# Patient Record
Sex: MALE | Race: WHITE | NOT HISPANIC OR LATINO | Employment: STUDENT | URBAN - METROPOLITAN AREA
[De-identification: names, ages, dates, MRNs, and addresses within clinical notes are randomized per-mention and may not be internally consistent; named-entity substitution may affect disease eponyms.]

---
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
## 2021-04-16 ENCOUNTER — BULK ORDERING (OUTPATIENT)
Dept: CASE MANAGEMENT | Facility: OTHER | Age: 21
End: 2021-04-16

## 2021-04-16 DIAGNOSIS — Z23 IMMUNIZATION DUE: ICD-10-CM

## 2021-07-12 ENCOUNTER — TELEPHONE (OUTPATIENT)
Dept: SPORTS MEDICINE | Facility: CLINIC | Age: 21
End: 2021-07-12

## 2021-07-12 NOTE — TELEPHONE ENCOUNTER
Caller: JESE DOAN    Relationship to patient: MOTHER    Best call back number: [phone]    Chief complaint: LOWER BACK PAIN    Type of visit: NEW PROBLEM    Requested date: ASAP    Additional notes: MOTHER STATES THAT PATIENT GOES BACK TO  AND IS WANTING AN MRI DONE BEFOREHAND. WANTS TO KNOW IF AN ORDER CAN BE SENT SOMEWHERE

## 2021-07-12 NOTE — TELEPHONE ENCOUNTER
Called and spoke with mother, informed we cannot just order MRI with no documentation or in office evaluation. She states he's going back to school, wants MRI and PT visits for him. Informed he will need an office visit for evaluation, and without this information insurance will more than likely deny the MRI and not pay for it. There has to be an indication for an MRI as well. Informed Dr. Lees was out of the office and our staffing was short this week as well. Informed we cannot guarantee an immediate appointment, I transferred her call to scheduling to make appointments best for him and his mother.   All information was verbally understood.    Thanks  Maryana

## 2021-07-21 ENCOUNTER — OFFICE VISIT (OUTPATIENT)
Dept: SPORTS MEDICINE | Facility: CLINIC | Age: 21
End: 2021-07-21

## 2021-07-21 VITALS
DIASTOLIC BLOOD PRESSURE: 56 MMHG | BODY MASS INDEX: 21.19 KG/M2 | OXYGEN SATURATION: 100 % | HEART RATE: 43 BPM | HEIGHT: 70 IN | TEMPERATURE: 97.4 F | WEIGHT: 148 LBS | SYSTOLIC BLOOD PRESSURE: 110 MMHG

## 2021-07-21 DIAGNOSIS — S39.012A STRAIN OF LUMBAR REGION, INITIAL ENCOUNTER: Primary | ICD-10-CM

## 2021-07-21 PROCEDURE — 72100 X-RAY EXAM L-S SPINE 2/3 VWS: CPT | Performed by: FAMILY MEDICINE

## 2021-07-21 PROCEDURE — 99213 OFFICE O/P EST LOW 20 MIN: CPT | Performed by: FAMILY MEDICINE

## 2021-07-21 NOTE — PROGRESS NOTES
"Chief Complaint  Back Pain (did some weightlifiting since march and injured himself during a deadlift. he states that he has done PT but wants to get a second opinion so he can get back to weightlifting again)    Subjective          Henri Aponte presents to [facility]  History of Present Illness  2 months ago patient was dead lifting, felt discomfort left lower back.  Pain has remained.  Pain worse with lifting or right lateral bending.  No radicular symptoms.  No rest pain.  Objective   Vital Signs:   /56 (BP Location: Left arm, Patient Position: Sitting, Cuff Size: Adult)   Pulse (!) 43   Temp 97.4 °F (36.3 °C) (Temporal)   Ht 177.8 cm (70\")   Wt 67.1 kg (148 lb)   SpO2 100%   BMI 21.24 kg/m²     Physical Exam  Vitals reviewed.   Constitutional:       Appearance: He is well-developed.   HENT:      Head: Normocephalic and atraumatic.   Eyes:      Conjunctiva/sclera: Conjunctivae normal.      Pupils: Pupils are equal, round, and reactive to light.   Cardiovascular:      Comments: No peripheral edema  Pulmonary:      Effort: Pulmonary effort is normal.   Musculoskeletal:      Comments: Emanation lumbar spine normal in general appearance.  Patient does have an area of tightness and tenderness to palpation along the left lower paravertebral bundles and slightly in the oblique area.  Discomfort is worse with right lateral bending.  Patient has full forward range of motion, no pain with extension and stork test.  Negative straight leg raise.   Skin:     General: Skin is warm and dry.   Neurological:      Mental Status: He is alert and oriented to person, place, and time.   Psychiatric:         Behavior: Behavior normal.        Result Review :                Lumbar Spine X-Ray  Indication: Pain  Views: AP and Lateral    Findings:  No fracture  No bony lesion  Normal soft tissues  Normal disc spaces    No prior studies were available for comparison.    Assessment and Plan  "   Diagnoses and all orders for this visit:    1. Strain of lumbar region, initial encounter (Primary)  -     XR Spine Lumbar 2 or 3 View      Offered patient prednisone and muscle relaxer, he states he prefer not to take any medication but would be appreciative of home exercises.  I have given him a set of home exercises and suggested that he follow-up here in 3 weeks if he is not seeing any significant improvement.  Would alter his weight lifting until his back is feeling better.      Follow Up   No follow-ups on file.  Patient was given instructions and counseling regarding his condition or for health maintenance advice. Please see specific information pulled into the AVS if appropriate.

## 2022-11-21 ENCOUNTER — OFFICE VISIT (OUTPATIENT)
Dept: SPORTS MEDICINE | Facility: CLINIC | Age: 22
End: 2022-11-21

## 2022-11-21 VITALS
HEART RATE: 60 BPM | HEIGHT: 70 IN | RESPIRATION RATE: 16 BRPM | OXYGEN SATURATION: 98 % | TEMPERATURE: 97.8 F | BODY MASS INDEX: 23.19 KG/M2 | DIASTOLIC BLOOD PRESSURE: 70 MMHG | SYSTOLIC BLOOD PRESSURE: 110 MMHG | WEIGHT: 162 LBS

## 2022-11-21 DIAGNOSIS — J30.1 SEASONAL ALLERGIC RHINITIS DUE TO POLLEN: ICD-10-CM

## 2022-11-21 DIAGNOSIS — R04.2 COUGH WITH HEMOPTYSIS: Primary | ICD-10-CM

## 2022-11-21 PROCEDURE — 99213 OFFICE O/P EST LOW 20 MIN: CPT | Performed by: FAMILY MEDICINE

## 2022-11-21 NOTE — PROGRESS NOTES
"Henri is a 21 y.o. year old male    Chief Complaint   Patient presents with   • Cough     New eval for cough with reports of coughing up blood - present for about 1 month, no injuries - reports being sick 09/2022 with a really bad cough - worse with increased activities - here for further evaluation and treatment        History of Present Illness   HPI   Cough started in September/October with acute illness. Slowly improved but cough persisted. Noted a few episodes of blood in sputum (faint pink). Allergies are problematic but not taking anything. No systemic symptoms.    Notes history of frequent pharyngitis growing up.     Review of Systems    /70 (BP Location: Left arm, Patient Position: Sitting, Cuff Size: Adult)   Pulse 60   Temp 97.8 °F (36.6 °C) (Temporal)   Resp 16   Ht 177.8 cm (70\")   Wt 73.5 kg (162 lb)   SpO2 98%   BMI 23.24 kg/m²          Physical Exam  Vitals reviewed.   Constitutional:       General: He is not in acute distress.     Appearance: Normal appearance. He is not ill-appearing.   HENT:      Mouth/Throat:      Tonsils: No tonsillar exudate or tonsillar abscesses. 2+ on the right. 2+ on the left.   Eyes:      Pupils: Pupils are equal, round, and reactive to light.   Cardiovascular:      Rate and Rhythm: Normal rate and regular rhythm.      Pulses: Normal pulses.      Heart sounds: Normal heart sounds.   Pulmonary:      Effort: Pulmonary effort is normal.      Breath sounds: Normal breath sounds. No rhonchi or rales.   Lymphadenopathy:      Cervical: Cervical adenopathy present.   Neurological:      Mental Status: He is alert.   Psychiatric:         Mood and Affect: Mood normal.       Chest X-Ray  Indication: Cough  Views: PA and Lateral    Findings:  Normal lung markings.  No pleural effusion.  Heart size and shape are normal.  Mediastinum is normal.  No visible rib fractures.   Overall, normal chest.    There were no previous studies available for comparison.     No current " outpatient medications on file.     Diagnoses and all orders for this visit:    Cough with hemoptysis  -     XR Chest PA & Lateral    Seasonal allergic rhinitis due to pollen    Overall I think this is benign, likely from bronchitis or his allergy drainage.  Discussed management both of these with over-the-counter allergy treatments, cough syrup, vaporizer/humidifier, etc.  We could consider steroid burst and prescription cough syrup but he would like to avoid those for now.        EMR Dragon/Transcription disclaimer:    Much of this encounter note is an electronic transcription/translation of spoken language to printed text.  The electronic translation of spoken language may permit erroneous, or at times, nonsensical words or phrases to be inadvertently transcribed.  Although I have reviewed the note for such errors some may still exist.

## 2022-12-14 ENCOUNTER — TELEPHONE (OUTPATIENT)
Dept: SPORTS MEDICINE | Facility: CLINIC | Age: 22
End: 2022-12-14

## 2022-12-14 DIAGNOSIS — R04.2 COUGH WITH HEMOPTYSIS: Primary | ICD-10-CM

## 2022-12-14 DIAGNOSIS — J30.1 SEASONAL ALLERGIC RHINITIS DUE TO POLLEN: ICD-10-CM

## 2022-12-14 NOTE — TELEPHONE ENCOUNTER
Patients mother called in on his behalf today since he taking his finals at . She make an appointment for him this week on Friday for a f/u re coughing up blood, since he is still having the same issues. Wants to know if you would want him to just see an ENT or a specialist in regards to his condition, or just do a f/u Friday and give recommendations at that time?    Thanks  Maryana

## 2022-12-14 NOTE — TELEPHONE ENCOUNTER
I called and spoke with mother, patient does not have an ENT, requested we place referral.   Referral placed, scheduling to call to set up.   Appointment for 12/16 canceled   All information was verbally understood     Thanks  Maryana

## 2022-12-14 NOTE — TELEPHONE ENCOUNTER
Patient mother called and is asking for a referral for a ENT.    Please follow up on this request.

## 2022-12-15 ENCOUNTER — TELEPHONE (OUTPATIENT)
Dept: SPORTS MEDICINE | Facility: CLINIC | Age: 22
End: 2022-12-15

## 2022-12-15 DIAGNOSIS — R04.2 COUGH WITH HEMOPTYSIS: Primary | ICD-10-CM

## 2022-12-15 NOTE — TELEPHONE ENCOUNTER
Mom stated she wanted to request a referral for a pulmonologist for evaluation.    Please follow up on this     -Anabella

## 2023-06-06 ENCOUNTER — OFFICE VISIT (OUTPATIENT)
Dept: SPORTS MEDICINE | Facility: CLINIC | Age: 23
End: 2023-06-06
Payer: COMMERCIAL

## 2023-06-06 ENCOUNTER — LAB (OUTPATIENT)
Dept: LAB | Facility: HOSPITAL | Age: 23
End: 2023-06-06
Payer: COMMERCIAL

## 2023-06-06 VITALS
TEMPERATURE: 98.2 F | DIASTOLIC BLOOD PRESSURE: 78 MMHG | WEIGHT: 167 LBS | SYSTOLIC BLOOD PRESSURE: 110 MMHG | BODY MASS INDEX: 23.91 KG/M2 | HEART RATE: 48 BPM | HEIGHT: 70 IN | OXYGEN SATURATION: 98 %

## 2023-06-06 DIAGNOSIS — Z00.00 ANNUAL PHYSICAL EXAM: Primary | ICD-10-CM

## 2023-06-06 LAB
ALBUMIN SERPL-MCNC: 4.1 G/DL (ref 3.5–5.2)
ALBUMIN/GLOB SERPL: 1.5 G/DL
ALP SERPL-CCNC: 54 U/L (ref 39–117)
ALT SERPL W P-5'-P-CCNC: 20 U/L (ref 1–41)
ANION GAP SERPL CALCULATED.3IONS-SCNC: 9.8 MMOL/L (ref 5–15)
AST SERPL-CCNC: 20 U/L (ref 1–40)
BASOPHILS # BLD AUTO: 0.05 10*3/MM3 (ref 0–0.2)
BASOPHILS NFR BLD AUTO: 1 % (ref 0–1.5)
BILIRUB SERPL-MCNC: 0.7 MG/DL (ref 0–1.2)
BILIRUB UR QL STRIP: NEGATIVE
BUN SERPL-MCNC: 17 MG/DL (ref 6–20)
BUN/CREAT SERPL: 21.8 (ref 7–25)
CALCIUM SPEC-SCNC: 9.7 MG/DL (ref 8.6–10.5)
CHLORIDE SERPL-SCNC: 104 MMOL/L (ref 98–107)
CHOLEST SERPL-MCNC: 177 MG/DL (ref 0–200)
CLARITY UR: CLEAR
CO2 SERPL-SCNC: 28.2 MMOL/L (ref 22–29)
COLOR UR: YELLOW
CREAT SERPL-MCNC: 0.78 MG/DL (ref 0.76–1.27)
DEPRECATED RDW RBC AUTO: 38.4 FL (ref 37–54)
EGFRCR SERPLBLD CKD-EPI 2021: 130.1 ML/MIN/1.73
EOSINOPHIL # BLD AUTO: 0.49 10*3/MM3 (ref 0–0.4)
EOSINOPHIL NFR BLD AUTO: 9.6 % (ref 0.3–6.2)
ERYTHROCYTE [DISTWIDTH] IN BLOOD BY AUTOMATED COUNT: 11.7 % (ref 12.3–15.4)
GLOBULIN UR ELPH-MCNC: 2.7 GM/DL
GLUCOSE SERPL-MCNC: 94 MG/DL (ref 65–99)
GLUCOSE UR STRIP-MCNC: NEGATIVE MG/DL
HCT VFR BLD AUTO: 44.6 % (ref 37.5–51)
HDLC SERPL QL: 2.64
HDLC SERPL-MCNC: 67 MG/DL (ref 40–60)
HGB BLD-MCNC: 15.4 G/DL (ref 13–17.7)
HGB UR QL STRIP.AUTO: NEGATIVE
IMM GRANULOCYTES # BLD AUTO: 0.01 10*3/MM3 (ref 0–0.05)
IMM GRANULOCYTES NFR BLD AUTO: 0.2 % (ref 0–0.5)
KETONES UR QL STRIP: NEGATIVE
LDLC SERPL CALC-MCNC: 100 MG/DL (ref 0–100)
LEUKOCYTE ESTERASE UR QL STRIP.AUTO: NEGATIVE
LYMPHOCYTES # BLD AUTO: 1.65 10*3/MM3 (ref 0.7–3.1)
LYMPHOCYTES NFR BLD AUTO: 32.4 % (ref 19.6–45.3)
MCH RBC QN AUTO: 31 PG (ref 26.6–33)
MCHC RBC AUTO-ENTMCNC: 34.5 G/DL (ref 31.5–35.7)
MCV RBC AUTO: 89.9 FL (ref 79–97)
MONOCYTES # BLD AUTO: 0.5 10*3/MM3 (ref 0.1–0.9)
MONOCYTES NFR BLD AUTO: 9.8 % (ref 5–12)
NEUTROPHILS NFR BLD AUTO: 2.4 10*3/MM3 (ref 1.7–7)
NEUTROPHILS NFR BLD AUTO: 47 % (ref 42.7–76)
NITRITE UR QL STRIP: NEGATIVE
NRBC BLD AUTO-RTO: 0 /100 WBC (ref 0–0.2)
PH UR STRIP.AUTO: 7 [PH] (ref 5–8)
PLATELET # BLD AUTO: 312 10*3/MM3 (ref 140–450)
PMV BLD AUTO: 8.9 FL (ref 6–12)
POTASSIUM SERPL-SCNC: 4.4 MMOL/L (ref 3.5–5.2)
PROT SERPL-MCNC: 6.8 G/DL (ref 6–8.5)
PROT UR QL STRIP: NEGATIVE
RBC # BLD AUTO: 4.96 10*6/MM3 (ref 4.14–5.8)
SODIUM SERPL-SCNC: 142 MMOL/L (ref 136–145)
SP GR UR STRIP: 1.01 (ref 1–1.03)
TRIGL SERPL-MCNC: 49 MG/DL (ref 0–150)
UROBILINOGEN UR QL STRIP: NORMAL
VLDLC SERPL-MCNC: 10 MG/DL (ref 5–40)
WBC NRBC COR # BLD: 5.1 10*3/MM3 (ref 3.4–10.8)

## 2023-06-06 PROCEDURE — 80053 COMPREHEN METABOLIC PANEL: CPT | Performed by: FAMILY MEDICINE

## 2023-06-06 PROCEDURE — 36415 COLL VENOUS BLD VENIPUNCTURE: CPT | Performed by: FAMILY MEDICINE

## 2023-06-06 PROCEDURE — 84443 ASSAY THYROID STIM HORMONE: CPT | Performed by: FAMILY MEDICINE

## 2023-06-06 PROCEDURE — 80061 LIPID PANEL: CPT | Performed by: FAMILY MEDICINE

## 2023-06-06 PROCEDURE — 99395 PREV VISIT EST AGE 18-39: CPT | Performed by: FAMILY MEDICINE

## 2023-06-06 PROCEDURE — 85025 COMPLETE CBC W/AUTO DIFF WBC: CPT | Performed by: FAMILY MEDICINE

## 2023-06-06 PROCEDURE — 81003 URINALYSIS AUTO W/O SCOPE: CPT | Performed by: FAMILY MEDICINE

## 2023-06-06 NOTE — PROGRESS NOTES
"Henri Aponte is here today for an annual physical exam.     Overall well without concerns.  Eating healthy diet, trying to put on weight with weightlifting.  Will graduate from [employer] in December with a finance degree, currently working in car sales.     PHQ-2 Depression Screening  Little interest or pleasure in doing things? 0-->not at all   Feeling down, depressed, or hopeless? 0-->not at all   PHQ-2 Total Score 0       Health Maintenance   Topic Date Due    COVID-19 Vaccine (1) Never done    HPV VACCINES (1 - Male 2-dose series) Never done    HEPATITIS C SCREENING  Never done    ANNUAL PHYSICAL  06/02/2021    INFLUENZA VACCINE  08/01/2023    TDAP/TD VACCINES (3 - Td or Tdap) 04/29/2031    MENINGOCOCCAL VACCINE  Completed    Pneumococcal Vaccine 0-64  Aged Out       Review of Systems    /78 (BP Location: Left arm, Patient Position: Sitting, Cuff Size: Adult)   Pulse (!) 48   Temp 98.2 °F (36.8 °C) (Temporal)   Ht 177.8 cm (70\")   Wt 75.8 kg (167 lb)   SpO2 98%   BMI 23.96 kg/m²      Physical Exam    Vital signs reviewed.  General appearance: No acute distress  Eyes: conjunctiva clear without erythema; pupils equally round and reactive  ENT: external ears normal; hearing normal  Neck: supple; no thyromegaly  CV: normal rate and rhythm; no peripheral edema  Respiratory: normal respiratory effort; lungs clear to auscultation bilaterally  MSK: normal gait and station; no focal joint deformity or swelling  Skin: no rash or wounds; normal turgor  Neuro: cranial nerves 2-12 grossly intact; normal sensation to light touch  Psych: mood and affect normal; recent and remote memory intact    No current outpatient medications on file.    Diagnoses and all orders for this visit:    1. Annual physical exam (Primary)  -     CBC & Differential  -     Comprehensive Metabolic Panel  -     Lipid Panel With / Chol / HDL Ratio  -     Thyroid Cascade Profile  -     Urinalysis With Culture If Indicated -        Encourage healthy " diet and exercise.  Encourage patient to stay up to date on screening examinations as indicated based on age and risk factors.  Anticipatory guidance given, discussed healthy foundations for physical, psychological, social, spiritual health.  Declines STD screening.    EMR Dragon/Transcription disclaimer:    Much of this encounter note is an electronic transcription/translation of spoken language to printed text.  The electronic translation of spoken language may permit erroneous, or at times, nonsensical words or phrases to be inadvertently transcribed.  Although I have reviewed the note for such errors some may still exist.

## 2023-06-07 LAB — TSH SERPL DL<=0.005 MIU/L-ACNC: 1.64 UIU/ML (ref 0.45–4.5)

## 2023-11-27 ENCOUNTER — TELEPHONE (OUTPATIENT)
Dept: SPORTS MEDICINE | Facility: CLINIC | Age: 23
End: 2023-11-27
Payer: COMMERCIAL

## 2023-11-27 NOTE — TELEPHONE ENCOUNTER
Returned call. Started feeling poorly 11/9, diagnosed later with Mono. Feeling well now. Discussed return to exercise, weightlifting, and snowboarding. 3 weeks for light exercise including cardio; 4 weeks for weightlifting. Technically ok at 4 weeks for full contact, may consider waiting until 5-6 for snowboarding. Latest reported case of spleen rupture post mono is at 7 weeks.

## 2023-11-27 NOTE — TELEPHONE ENCOUNTER
Patient called office stating that he has Mono, patient has some questions and would like to speak with Dr. Lees.      Please advise,

## 2023-12-04 ENCOUNTER — TELEPHONE (OUTPATIENT)
Dept: SPORTS MEDICINE | Facility: CLINIC | Age: 23
End: 2023-12-04
Payer: COMMERCIAL

## 2023-12-04 NOTE — TELEPHONE ENCOUNTER
Patient called stating that he starting feeling abdominal pain on 11/29 and went to urgent care. Patient states he was told his spleen was a little inflamed.     Patient sates he is still having pain and would like know if you can order a CT of abdomen.     Please advise,

## 2023-12-05 DIAGNOSIS — R10.12 LEFT UPPER QUADRANT ABDOMINAL PAIN: Primary | ICD-10-CM

## 2023-12-05 NOTE — TELEPHONE ENCOUNTER
Caller: PATIENT    Relationship to patient: SELF     Best call back number: [phone]    Patient is needing: PATIENT WAS CALLING TO GET AN UPDATE ON GETTING A CT SCAN ORDERED. PATIENT STATED HE IS IN [place], [place] RIGHT NOW AND WANTED TO DISCUSS GETTING THE CT SCAN DONE IN [place] IF POSSIBLE. PATIENT WOULD LIKE A CALL BACK ASAP DUE TO NOT GETTING A BACK YESTERDAY. THANK YOU!

## 2023-12-11 ENCOUNTER — HOSPITAL ENCOUNTER (OUTPATIENT)
Dept: CT IMAGING | Facility: HOSPITAL | Age: 23
Discharge: HOME | End: 2023-12-11
Admitting: FAMILY MEDICINE
Payer: COMMERCIAL

## 2023-12-11 DIAGNOSIS — R10.12 LEFT UPPER QUADRANT ABDOMINAL PAIN: ICD-10-CM

## 2023-12-11 PROCEDURE — 25510000001 IOPAMIDOL 61 % SOLUTION: Performed by: FAMILY MEDICINE

## 2023-12-11 PROCEDURE — 74177 CT ABD & PELVIS W/CONTRAST: CPT

## 2023-12-11 RX ADMIN — IOPAMIDOL 85 ML: 612 INJECTION, SOLUTION INTRAVENOUS at 15:35

## 2023-12-12 NOTE — PROGRESS NOTES
Patient notified of results via [employer].I called and followed up with patient to make sure they reviewed results, patient confirmed they reviewed results and they didn't have any further questions or requests at this time. No further action necessary at this time.     Patient states he is still having slight pain, patient is wondering this could be a result from him losing weight recently.     Please advise,     ANSLEY Domingo

## 2023-12-15 NOTE — PROGRESS NOTES
I attempted to contact patient, could not get a hold of them. LVM for patient to call back.    ANSLEY Domingo

## 2023-12-20 ENCOUNTER — OFFICE VISIT (OUTPATIENT)
Dept: SPORTS MEDICINE | Facility: CLINIC | Age: 23
End: 2023-12-20
Payer: COMMERCIAL

## 2023-12-20 VITALS
DIASTOLIC BLOOD PRESSURE: 82 MMHG | HEIGHT: 70 IN | WEIGHT: 170 LBS | OXYGEN SATURATION: 98 % | HEART RATE: 75 BPM | TEMPERATURE: 98.6 F | SYSTOLIC BLOOD PRESSURE: 124 MMHG | BODY MASS INDEX: 24.34 KG/M2

## 2023-12-20 DIAGNOSIS — R10.12 LEFT UPPER QUADRANT ABDOMINAL PAIN: Primary | ICD-10-CM

## 2023-12-20 PROCEDURE — 99213 OFFICE O/P EST LOW 20 MIN: CPT | Performed by: FAMILY MEDICINE

## 2023-12-20 NOTE — PROGRESS NOTES
"Henri is a 23 y.o. year old male    Chief Complaint   Patient presents with    Abdominal Pain     Patient is here to follow up on abdominal cramping that started over a month ago.        History of Present Illness   HPI   Here for abdominal pain, variable locations, some LUQ - post mono - CT negative to r/o splenic complication. Duration about 3 weeks  Other mono symptoms resolved (sore throat, uri) but notes he lost about 15 pounds  No nausea, tolerating usual diet. Normal typical BMs.   Notices more at rest. Comes and goes intermittently, episodes maybe 10 minutes  Tolerating light exercises.       Reviewed CT a/p all normal    Review of Systems    /82 (BP Location: Left arm, Patient Position: Sitting, Cuff Size: Adult)   Pulse 75   Temp 98.6 °F (37 °C) (Temporal)   Ht 177.8 cm (70\")   Wt 77.1 kg (170 lb)   SpO2 98%   BMI 24.39 kg/m²          Physical Exam  Vitals and nursing note reviewed.   Constitutional:       Appearance: He is well-developed.   HENT:      Head: Normocephalic and atraumatic.   Eyes:      Conjunctiva/sclera: Conjunctivae normal.      Pupils: Pupils are equal, round, and reactive to light.   Cardiovascular:      Rate and Rhythm: Normal rate and regular rhythm.      Heart sounds: Normal heart sounds.   Pulmonary:      Effort: Pulmonary effort is normal.      Breath sounds: Normal breath sounds.   Musculoskeletal:      Cervical back: Normal range of motion.   Skin:     General: Skin is warm and dry.   Neurological:      Mental Status: He is alert and oriented to person, place, and time.   Psychiatric:         Judgment: Judgment normal.         No current outpatient medications on file.     Diagnoses and all orders for this visit:    Left upper quadrant abdominal pain    No specific findings.  Agreed to monitor and pursue further workup if needed.        EMR Dragon/Transcription disclaimer:    Much of this encounter note is an electronic transcription/translation of spoken language to " printed text.  The electronic translation of spoken language may permit erroneous, or at times, nonsensical words or phrases to be inadvertently transcribed.  Although I have reviewed the note for such errors some may still exist.

## 2024-01-25 ENCOUNTER — PATIENT ROUNDING (BHMG ONLY) (OUTPATIENT)
Dept: URGENT CARE | Facility: CLINIC | Age: 24
End: 2024-01-25
Payer: COMMERCIAL

## 2024-01-25 NOTE — ED NOTES
Thank you for letting us care for you in your recent visit to our urgent care center. We would love to hear about your experience with us. Was this the first time you have visited our location?    We’re always looking for ways to make our patients’ experiences even better. Do you have any recommendations on ways we may improve?     I appreciate you taking the time to respond. Please be on the lookout for a survey about your recent visit from [employer] via text or email. We would greatly appreciate if you could fill that out and turn it back in. We want your voice to be heard and we value your feedback.   Thank you for choosing [facility] for your healthcare needs.

## 2024-02-27 ENCOUNTER — OFFICE VISIT (OUTPATIENT)
Dept: SPORTS MEDICINE | Facility: CLINIC | Age: 24
End: 2024-02-27
Payer: COMMERCIAL

## 2024-02-27 VITALS
SYSTOLIC BLOOD PRESSURE: 112 MMHG | HEIGHT: 70 IN | TEMPERATURE: 96 F | HEART RATE: 73 BPM | DIASTOLIC BLOOD PRESSURE: 78 MMHG | WEIGHT: 157 LBS | OXYGEN SATURATION: 98 % | BODY MASS INDEX: 22.48 KG/M2

## 2024-02-27 DIAGNOSIS — M94.0 COSTOCHONDRITIS: Primary | ICD-10-CM

## 2024-02-27 PROCEDURE — 99213 OFFICE O/P EST LOW 20 MIN: CPT | Performed by: FAMILY MEDICINE

## 2024-02-27 NOTE — PROGRESS NOTES
"Henri is a 23 y.o. year old male    Chief Complaint   Patient presents with    Abdominal Pain     LEFT ABDOMEN- Patient is here for initial evaluation of left abdomen pain, patient had a fall while snow boarding on 01/03, Xrays done at .        History of Present Illness   HPI     The patient is here today for left lower rib cage pain.    He fell snowboarding on 01/03/2024 and had immediate onset of pain there. He later was seen on 01/22/2024 with rib x-rays that were normal. He is here today because he is having some ongoing discomfort and is concerned about his return to normal activities. Pain is relatively mild, worse with certain positions like twisting or bending in particular. He denies any dyspnea, difficulty breathing, or significant pain with breathing.      Review of Systems    /78 (BP Location: Left arm, Patient Position: Sitting, Cuff Size: Adult)   Pulse 73   Temp 96 °F (35.6 °C) (Temporal)   Ht 177.8 cm (70\")   Wt 71.2 kg (157 lb)   SpO2 98%   BMI 22.53 kg/m²          Physical Exam  Vitals and nursing note reviewed.   Constitutional:       Appearance: Normal appearance. He is well-developed.   Pulmonary:      Effort: Pulmonary effort is normal.      Breath sounds: Normal air entry.      Comments: Normal respiratory effort. There is normal chest excursion.  Chest:      Comments: There is tenderness to palpation very subtly on the anterior lower left costochondral articulation. There is no crepitus noted on my examination.  Skin:     Comments: Normal skin appearance without ecchymosis.   Neurological:      Mental Status: He is alert and oriented to person, place, and time.      Motor: Motor function is intact.   Psychiatric:         Mood and Affect: Mood normal.         Behavior: Behavior normal.           Current Outpatient Medications:     diclofenac (VOLTAREN) 75 MG EC tablet, Take 1 tablet by mouth 2 (Two) Times a Day., Disp: 20 tablet, Rfl: 0     Diagnoses and all orders for this " visit:    Costochondritis    1. Costochondritis.  I reviewed images from rib series x-rays performed on 01/22/2024 for independent interpretation. There is no visible fracture. Normal underlying lungs. We discussed the nature of costochondritis. I expect this to continue to improve with conservative measures. We discussed return to normal activities as tolerated. If he does not progress, we can consider advanced imaging.    Transcribed from ambient dictation for Andrew Lees MD by Jeanette Stewart.  02/27/24   10:17 EST    I have personally performed the services described in this document as transcribed by the above individual, and it is both accurate and complete.  Andrew Lees MD  2/27/2024  15:20 EST

## 2024-06-13 ENCOUNTER — TELEPHONE (OUTPATIENT)
Dept: SPORTS MEDICINE | Facility: CLINIC | Age: 24
End: 2024-06-13
Payer: COMMERCIAL

## 2024-06-13 NOTE — TELEPHONE ENCOUNTER
Patient's chart shows patient is currently being registered at [facility] for this issue.

## 2024-06-13 NOTE — TELEPHONE ENCOUNTER
Patient was seen in [facility] this afternoon with a R testicle issue.  Patient is requesting an ultrasound.    Please advise.

## 2024-06-27 ENCOUNTER — OFFICE VISIT (OUTPATIENT)
Dept: SPORTS MEDICINE | Facility: CLINIC | Age: 24
End: 2024-06-27
Payer: COMMERCIAL

## 2024-06-27 VITALS
SYSTOLIC BLOOD PRESSURE: 126 MMHG | HEART RATE: 50 BPM | BODY MASS INDEX: 23.34 KG/M2 | WEIGHT: 163 LBS | HEIGHT: 70 IN | OXYGEN SATURATION: 98 % | TEMPERATURE: 97.5 F | DIASTOLIC BLOOD PRESSURE: 68 MMHG

## 2024-06-27 DIAGNOSIS — N45.1 EPIDIDYMITIS: Primary | ICD-10-CM

## 2024-06-27 PROCEDURE — 99213 OFFICE O/P EST LOW 20 MIN: CPT | Performed by: FAMILY MEDICINE

## 2024-06-27 NOTE — PROGRESS NOTES
"Henri is a 23 y.o. year old male     Chief Complaint   Patient presents with    Testicle Pain     Patient states he started having pain while running, ER visit on 06/13.        History of Present Illness  History of Present Illness  The patient is here today to follow up on recent diagnosis of epididymitis.    The patient began experiencing discomfort around 06/07/2023, subsequent to a running activity. Subsequently, he sought emergency care on 06/13/2023, during which a urine test was conducted, yielding negative results for gonorrhea and chlamydia. An ultrasound of the testicle revealed no evidence of torsion, but showed swelling and a 5 mm cyst in the right epididymis. His treatment regimen included Rocephin and doxycycline for epididymitis. Since this treatment, he reports that his symptoms have minimally changed. He denies experiencing any pain, frequency with urination, obvious skin changes, fever, chills, or abnormal sexual function. However, he continues to experience some pain and tenderness in the right side of the scrotum.    I have reviewed the patient's medical, family, and social history in detail and updated the computerized patient record.    Review of Systems    /68 (BP Location: Right arm, Patient Position: Sitting, Cuff Size: Adult)   Pulse 50   Temp 97.5 °F (36.4 °C) (Temporal)   Ht 177.8 cm (70\")   Wt 73.9 kg (163 lb)   SpO2 98%   BMI 23.39 kg/m²      Physical Exam    Vital signs reviewed.   General: No acute distress.      Physical Exam  Mild edema and a tiny nodule are present in the epididymis on the right side of the scrotum. No testicular mass or direct testicular tenderness. Left testicle and epididymis are normal. Overlying skin is normal. No lesions noted.    Results  Results  Laboratory Studies  Urine gonorrhea, chlamydia tests were negative.    Imaging  Ultrasound of the testicle showed no evidence of torsion, but showed swelling and a 5 mm cyst in the right " epididymis.    Procedures     Diagnoses and all orders for this visit:    Epididymitis      Assessment & Plan  1. Resolving epididymitis.  The patient's symptoms suggest a resolving epididymitis, potentially a result of previous exercise-induced trauma rather than an infectious etiology. A regimen of ibuprofen 400 mg, to be taken thrice daily for a duration of 2 weeks, has been recommended. Additionally, gentle support to the scrotum are recommended for daily activities to reduce trauma, while avoiding excessive compression. Should there be no improvement after approximately 2 weeks, a consultation with a urologist will be arranged.    Patient or patient representative verbalized consent for the use of Ambient Listening during the visit with  Andrew Lees MD for chart documentation. 6/27/2024  08:32 EDT  *Dictated after leaving exam room.   Andrew Lees MD   08:28 EDT   06/27/24

## 2024-09-05 ENCOUNTER — OFFICE VISIT (OUTPATIENT)
Dept: SPORTS MEDICINE | Facility: CLINIC | Age: 24
End: 2024-09-05
Payer: COMMERCIAL

## 2024-09-05 ENCOUNTER — LAB (OUTPATIENT)
Dept: LAB | Facility: HOSPITAL | Age: 24
End: 2024-09-05
Payer: COMMERCIAL

## 2024-09-05 VITALS
OXYGEN SATURATION: 97 % | WEIGHT: 159 LBS | DIASTOLIC BLOOD PRESSURE: 74 MMHG | HEART RATE: 73 BPM | TEMPERATURE: 98.4 F | HEIGHT: 70 IN | SYSTOLIC BLOOD PRESSURE: 124 MMHG | RESPIRATION RATE: 12 BRPM | BODY MASS INDEX: 22.76 KG/M2

## 2024-09-05 DIAGNOSIS — Z00.00 ANNUAL PHYSICAL EXAM: Primary | ICD-10-CM

## 2024-09-05 LAB
ALBUMIN SERPL-MCNC: 4.8 G/DL (ref 3.5–5.2)
ALBUMIN/GLOB SERPL: 1.8 G/DL
ALP SERPL-CCNC: 63 U/L (ref 39–117)
ALT SERPL W P-5'-P-CCNC: 10 U/L (ref 1–41)
ANION GAP SERPL CALCULATED.3IONS-SCNC: 7.6 MMOL/L (ref 5–15)
AST SERPL-CCNC: 14 U/L (ref 1–40)
BASOPHILS # BLD AUTO: 0.06 10*3/MM3 (ref 0–0.2)
BASOPHILS NFR BLD AUTO: 0.9 % (ref 0–1.5)
BILIRUB SERPL-MCNC: 0.7 MG/DL (ref 0–1.2)
BILIRUB UR QL STRIP: NEGATIVE
BUN SERPL-MCNC: 13 MG/DL (ref 6–20)
BUN/CREAT SERPL: 13 (ref 7–25)
CALCIUM SPEC-SCNC: 10 MG/DL (ref 8.6–10.5)
CHLORIDE SERPL-SCNC: 102 MMOL/L (ref 98–107)
CHOLEST SERPL-MCNC: 177 MG/DL (ref 0–200)
CLARITY UR: CLEAR
CO2 SERPL-SCNC: 33.4 MMOL/L (ref 22–29)
COLOR UR: YELLOW
CREAT SERPL-MCNC: 1 MG/DL (ref 0.76–1.27)
DEPRECATED RDW RBC AUTO: 36.6 FL (ref 37–54)
EGFRCR SERPLBLD CKD-EPI 2021: 109.1 ML/MIN/1.73
EOSINOPHIL # BLD AUTO: 0.26 10*3/MM3 (ref 0–0.4)
EOSINOPHIL NFR BLD AUTO: 3.7 % (ref 0.3–6.2)
ERYTHROCYTE [DISTWIDTH] IN BLOOD BY AUTOMATED COUNT: 11.3 % (ref 12.3–15.4)
GLOBULIN UR ELPH-MCNC: 2.7 GM/DL
GLUCOSE SERPL-MCNC: 86 MG/DL (ref 65–99)
GLUCOSE UR STRIP-MCNC: NEGATIVE MG/DL
HCT VFR BLD AUTO: 45.3 % (ref 37.5–51)
HDLC SERPL QL: 2.95
HDLC SERPL-MCNC: 60 MG/DL (ref 40–60)
HGB BLD-MCNC: 15.7 G/DL (ref 13–17.7)
HGB UR QL STRIP.AUTO: NEGATIVE
IMM GRANULOCYTES # BLD AUTO: 0.01 10*3/MM3 (ref 0–0.05)
IMM GRANULOCYTES NFR BLD AUTO: 0.1 % (ref 0–0.5)
KETONES UR QL STRIP: NEGATIVE
LDLC SERPL CALC-MCNC: 105 MG/DL (ref 0–100)
LEUKOCYTE ESTERASE UR QL STRIP.AUTO: NEGATIVE
LYMPHOCYTES # BLD AUTO: 1.96 10*3/MM3 (ref 0.7–3.1)
LYMPHOCYTES NFR BLD AUTO: 27.9 % (ref 19.6–45.3)
MCH RBC QN AUTO: 30.9 PG (ref 26.6–33)
MCHC RBC AUTO-ENTMCNC: 34.7 G/DL (ref 31.5–35.7)
MCV RBC AUTO: 89.2 FL (ref 79–97)
MONOCYTES # BLD AUTO: 0.71 10*3/MM3 (ref 0.1–0.9)
MONOCYTES NFR BLD AUTO: 10.1 % (ref 5–12)
NEUTROPHILS NFR BLD AUTO: 4.03 10*3/MM3 (ref 1.7–7)
NEUTROPHILS NFR BLD AUTO: 57.3 % (ref 42.7–76)
NITRITE UR QL STRIP: NEGATIVE
NRBC BLD AUTO-RTO: 0 /100 WBC (ref 0–0.2)
PH UR STRIP.AUTO: 7.5 [PH] (ref 5–8)
PLATELET # BLD AUTO: 331 10*3/MM3 (ref 140–450)
PMV BLD AUTO: 8.9 FL (ref 6–12)
POTASSIUM SERPL-SCNC: 4.4 MMOL/L (ref 3.5–5.2)
PROT SERPL-MCNC: 7.5 G/DL (ref 6–8.5)
PROT UR QL STRIP: NEGATIVE
RBC # BLD AUTO: 5.08 10*6/MM3 (ref 4.14–5.8)
SODIUM SERPL-SCNC: 143 MMOL/L (ref 136–145)
SP GR UR STRIP: 1.02 (ref 1–1.03)
TRIGL SERPL-MCNC: 60 MG/DL (ref 0–150)
UROBILINOGEN UR QL STRIP: NORMAL
VLDLC SERPL-MCNC: 12 MG/DL (ref 5–40)
WBC NRBC COR # BLD AUTO: 7.03 10*3/MM3 (ref 3.4–10.8)

## 2024-09-05 PROCEDURE — 85025 COMPLETE CBC W/AUTO DIFF WBC: CPT | Performed by: FAMILY MEDICINE

## 2024-09-05 PROCEDURE — 81003 URINALYSIS AUTO W/O SCOPE: CPT | Performed by: FAMILY MEDICINE

## 2024-09-05 PROCEDURE — 36415 COLL VENOUS BLD VENIPUNCTURE: CPT | Performed by: FAMILY MEDICINE

## 2024-09-05 PROCEDURE — 99395 PREV VISIT EST AGE 18-39: CPT | Performed by: FAMILY MEDICINE

## 2024-09-05 PROCEDURE — 80053 COMPREHEN METABOLIC PANEL: CPT | Performed by: FAMILY MEDICINE

## 2024-09-05 PROCEDURE — 80061 LIPID PANEL: CPT | Performed by: FAMILY MEDICINE

## 2024-09-05 PROCEDURE — 84443 ASSAY THYROID STIM HORMONE: CPT | Performed by: FAMILY MEDICINE

## 2024-09-05 NOTE — PROGRESS NOTES
"Henri Aponte is here today for an annual physical exam.     Diet: Well Balanced Diet  Exercise: running/ jogging and weightlifting  Describes still having some intermittent pain in the left ribs.  Also describes intermittent testicular pain.  Benign urology evaluation.    PHQ-2 Depression Screening  Little interest or pleasure in doing things? 0-->not at all   Feeling down, depressed, or hopeless? 0-->not at all   PHQ-2 Total Score 0       Health Maintenance   Topic Date Due    HEPATITIS C SCREENING  Never done    ANNUAL PHYSICAL  06/06/2024    COVID-19 Vaccine (1 - 2023-24 season) Never done    INFLUENZA VACCINE  08/01/2024    TDAP/TD VACCINES (3 - Td or Tdap) 04/29/2031    MENINGOCOCCAL VACCINE  Completed    Pneumococcal Vaccine 0-64  Aged Out       Review of Systems    /74 (BP Location: Left arm, Patient Position: Sitting, Cuff Size: Adult)   Pulse 73   Temp 98.4 °F (36.9 °C) (Infrared)   Resp 12   Ht 177.8 cm (70\")   Wt 72.1 kg (159 lb)   SpO2 97%   BMI 22.81 kg/m²      BMI is within normal parameters. No other follow-up for BMI required.      Physical Exam    Vital signs reviewed.  General appearance: No acute distress  Eyes: conjunctiva clear without erythema; pupils equally round and reactive  ENT: external ears normal; hearing normal  Neck: supple; no thyromegaly  CV: normal rate and rhythm; no peripheral edema  Respiratory: normal respiratory effort; lungs clear to auscultation bilaterally  MSK: normal gait and station; no focal joint deformity or swelling  Skin: no rash or wounds; normal turgor  Neuro: cranial nerves 2-12 grossly intact; normal sensation to light touch  Psych: mood and affect normal; recent and remote memory intact    No current outpatient medications on file.    Diagnoses and all orders for this visit:    1. Annual physical exam (Primary)  -     CBC & Differential  -     Comprehensive Metabolic Panel  -     Lipid Panel With / Chol / HDL Ratio  -     Thyroid Cascade " Profile  -     Urinalysis With Culture If Indicated - Urine, Clean Catch  -     Cancel: [employer] Urine Culture Tube - Urine, Clean Catch        Encourage healthy diet and exercise.  Encourage patient to stay up to date on screening examinations as indicated based on age and risk factors.  Regarding his  symptoms recommend daily naproxen for 1 month.  If symptoms are unchanged then recommend follow-up with urology.    EMR Dragon/Transcription disclaimer:    Much of this encounter note is an electronic transcription/translation of spoken language to printed text.  The electronic translation of spoken language may permit erroneous, or at times, nonsensical words or phrases to be inadvertently transcribed.  Although I have reviewed the note for such errors some may still exist.

## 2024-09-07 LAB — TSH SERPL DL<=0.005 MIU/L-ACNC: 1.31 UIU/ML (ref 0.45–4.5)

## 2024-11-14 ENCOUNTER — OFFICE VISIT (OUTPATIENT)
Dept: SPORTS MEDICINE | Facility: CLINIC | Age: 24
End: 2024-11-14
Payer: COMMERCIAL

## 2024-11-14 VITALS
HEIGHT: 70 IN | DIASTOLIC BLOOD PRESSURE: 62 MMHG | SYSTOLIC BLOOD PRESSURE: 108 MMHG | HEART RATE: 60 BPM | TEMPERATURE: 95.5 F | WEIGHT: 159 LBS | OXYGEN SATURATION: 97 % | BODY MASS INDEX: 22.76 KG/M2

## 2024-11-14 DIAGNOSIS — S39.011A ABDOMINAL WALL STRAIN, INITIAL ENCOUNTER: Primary | ICD-10-CM

## 2024-11-14 NOTE — PROGRESS NOTES
"Henri is a 23 y.o. year old male     Chief Complaint   Patient presents with   • Testicle Pain     Patient is here to follow up on his pain.        History of Present Illness  History of Present Illness      I have reviewed the patient's medical, family, and social history in detail and updated the computerized patient record.    Review of Systems    /62 (BP Location: Right arm, Patient Position: Sitting, Cuff Size: Adult)   Pulse 60   Temp 95.5 °F (35.3 °C) (Temporal)   Ht 177.8 cm (70\")   Wt 72.1 kg (159 lb)   SpO2 97%   BMI 22.81 kg/m²      Physical Exam    Vital signs reviewed.   General: No acute distress.      Physical Exam      Results  Results      Procedures     There are no diagnoses linked to this encounter.  Assessment & Plan      {STEVE [employer] Provider Statement:40639}    *Dictated after leaving exam room.     Andrew Lees MD   09:56 EST   11/14/24   "

## 2024-11-14 NOTE — PATIENT INSTRUCTIONS
V-up - hold position for 10 seconds, increase gradually to 30 seconds    Bridge - hold with both legs down for 10 seconds, then straighten legs alternating, then rest for 5 seconds    Side plank - start holding for 10 seconds, increase gradually to 30 seconds (do both sides)    Bird dogs - hold each side for 10 seconds, rest between, build up to 10 each side    Consider looking at pilates exercise either in person or videos at home

## 2024-11-24 NOTE — PROGRESS NOTES
"Henri is a 23 y.o. year old male     Chief Complaint   Patient presents with    Testicle Pain     Patient is here to follow up on his pain.        History of Present Illness  HPI     Here to follow-up on right sided testicle/scrotum pain.  His symptoms are essentially unchanged after weeks of observation and negative evaluation with urology.  He notes recently some right-sided lower abdominal wall discomfort.    Review of Systems    /62 (BP Location: Right arm, Patient Position: Sitting, Cuff Size: Adult)   Pulse 60   Temp 95.5 °F (35.3 °C) (Temporal)   Ht 177.8 cm (70\")   Wt 72.1 kg (159 lb)   SpO2 97%   BMI 22.81 kg/m²      Physical Exam    Vital signs reviewed.   General: No acute distress.      MSK Exam:  Ortho Exam  There is no tenderness palpation but there is reproducible discomfort with stress maneuvers on the inferior right side core musculature.  This was accomplished with straight leg raises, bridging, and other oblique/transverse abdominis activation.  No palpable hernia.  Procedures     Diagnoses and all orders for this visit:    Abdominal wall strain, initial encounter    It seems feasible that he could have some muscular strain causing some stress on the ilioinguinal or genitofemoral nerve with radiating discomfort.  He is going to focus on stability based core strengthening and see how this affects his symptoms.      EMR Dragon/Transcription disclaimer:    Much of this encounter note is an electronic transcription/translation of spoken language to printed text.  The electronic translation of spoken language may permit erroneous, or at times, nonsensical words or phrases to be inadvertently transcribed.  Although I have reviewed the note for such errors some may still exist.    "

## 2025-05-06 ENCOUNTER — LAB (OUTPATIENT)
Dept: LAB | Facility: HOSPITAL | Age: 25
End: 2025-05-06
Payer: COMMERCIAL

## 2025-05-06 ENCOUNTER — OFFICE VISIT (OUTPATIENT)
Dept: SPORTS MEDICINE | Facility: CLINIC | Age: 25
End: 2025-05-06
Payer: COMMERCIAL

## 2025-05-06 VITALS
SYSTOLIC BLOOD PRESSURE: 108 MMHG | DIASTOLIC BLOOD PRESSURE: 80 MMHG | TEMPERATURE: 98 F | HEIGHT: 70 IN | HEART RATE: 81 BPM | WEIGHT: 170 LBS | BODY MASS INDEX: 24.34 KG/M2 | OXYGEN SATURATION: 98 %

## 2025-05-06 DIAGNOSIS — N50.819 EPIDIDYMAL PAIN: Primary | ICD-10-CM

## 2025-05-06 LAB
BACTERIA UR QL AUTO: NORMAL /HPF
BILIRUB UR QL STRIP: NEGATIVE
C TRACH RRNA CVX QL NAA+PROBE: NOT DETECTED
CLARITY UR: CLEAR
COLOR UR: YELLOW
GLUCOSE UR STRIP-MCNC: NEGATIVE MG/DL
HGB UR QL STRIP.AUTO: NEGATIVE
HYALINE CASTS UR QL AUTO: NORMAL /LPF
KETONES UR QL STRIP: NEGATIVE
LEUKOCYTE ESTERASE UR QL STRIP.AUTO: NEGATIVE
N GONORRHOEA RRNA SPEC QL NAA+PROBE: NOT DETECTED
NITRITE UR QL STRIP: NEGATIVE
PH UR STRIP.AUTO: 6 [PH] (ref 5–8)
PROT UR QL STRIP: NEGATIVE
PSA SERPL-MCNC: 0.78 NG/ML (ref 0–4)
RBC # UR STRIP: NORMAL /HPF
REF LAB TEST METHOD: NORMAL
SP GR UR STRIP: 1.02 (ref 1–1.03)
SQUAMOUS #/AREA URNS HPF: NORMAL /HPF
UROBILINOGEN UR QL STRIP: NORMAL
WBC # UR STRIP: NORMAL /HPF

## 2025-05-06 PROCEDURE — 87591 N.GONORRHOEAE DNA AMP PROB: CPT | Performed by: FAMILY MEDICINE

## 2025-05-06 PROCEDURE — 36415 COLL VENOUS BLD VENIPUNCTURE: CPT | Performed by: FAMILY MEDICINE

## 2025-05-06 PROCEDURE — 87491 CHLMYD TRACH DNA AMP PROBE: CPT | Performed by: FAMILY MEDICINE

## 2025-05-06 PROCEDURE — 84153 ASSAY OF PSA TOTAL: CPT | Performed by: FAMILY MEDICINE

## 2025-05-06 PROCEDURE — 99214 OFFICE O/P EST MOD 30 MIN: CPT | Performed by: FAMILY MEDICINE

## 2025-05-06 PROCEDURE — 81001 URINALYSIS AUTO W/SCOPE: CPT | Performed by: FAMILY MEDICINE

## 2025-05-06 RX ORDER — DOXYCYCLINE 100 MG/1
1 CAPSULE ORAL EVERY 12 HOURS SCHEDULED
COMMUNITY
Start: 2025-04-17

## 2025-05-06 NOTE — PROGRESS NOTES
"Henri is a 24 y.o. year old male    Chief Complaint   Patient presents with    Testicle Pain       History of Present Illness   HPI   Here today for persistent pain in the right testicle/epididymis.  He has been dealing with this for nearly a year now.  Did have follow-up with urology including additional testing that was negative.  Unfortunately has persistent discomfort that is worse with physical activity including exercise at the gym.  He has tried using different undergarments without success.  He had one episode of the hematospermia but no dysuria or discharge.    Review of Systems    /80 (BP Location: Left arm, Patient Position: Sitting, Cuff Size: Adult)   Pulse 81   Temp 98 °F (36.7 °C) (Temporal)   Ht 177.8 cm (70\")   Wt 77.1 kg (170 lb)   SpO2 98%   BMI 24.39 kg/m²          Physical Exam  Vitals reviewed.   Neurological:      Mental Status: He is alert.   Psychiatric:         Mood and Affect: Mood normal.           Current Outpatient Medications:     doxycycline (VIBRAMYCIN) 100 MG capsule, Take 1 capsule by mouth Every 12 (Twelve) Hours. (Patient not taking: Reported on 5/6/2025), Disp: , Rfl:      Diagnoses and all orders for this visit:    Epididymal pain  -     Chlamydia trachomatis, Neisseria gonorrhoeae, PCR - Urine, Urine, Clean Catch  -     Urinalysis With Microscopic - Urine, Clean Catch  -     Urine Culture - Urine, Urine, Clean Catch  -     PSA DIAGNOSTIC    Other orders  -     doxycycline (VIBRAMYCIN) 100 MG capsule; Take 1 capsule by mouth Every 12 (Twelve) Hours. (Patient not taking: Reported on 5/6/2025)       Given his chronic ongoing pain I think would be best to go ahead and repeat urine studies also PSA to rule out some form of prostatitis.  Reviewed last urology consult and agree that if this is unrevealing then empiric treatment with doxycycline for 3 weeks to be very reasonable.      Please note that portions of this note were completed with a voice recognition program.   "